# Patient Record
Sex: MALE | Race: BLACK OR AFRICAN AMERICAN | NOT HISPANIC OR LATINO | Employment: OTHER | URBAN - METROPOLITAN AREA
[De-identification: names, ages, dates, MRNs, and addresses within clinical notes are randomized per-mention and may not be internally consistent; named-entity substitution may affect disease eponyms.]

---

## 2018-11-30 ENCOUNTER — HOSPITAL ENCOUNTER (EMERGENCY)
Facility: HOSPITAL | Age: 33
Discharge: HOME | End: 2018-12-01
Attending: EMERGENCY MEDICINE
Payer: MEDICARE

## 2018-11-30 DIAGNOSIS — R07.81 RIB PAIN ON LEFT SIDE: ICD-10-CM

## 2018-11-30 DIAGNOSIS — R10.9 LEFT FLANK PAIN: ICD-10-CM

## 2018-11-30 LAB
BILIRUB UR QL STRIP: NEGATIVE
BUN SERPL-MCNC: 10 MG/DL (ref 6–30)
CHLORIDE SERPL-SCNC: 103 MMOL/L (ref 95–110)
CLARITY UR REFRACT.AUTO: CLEAR
COLOR UR AUTO: YELLOW
CREAT SERPL-MCNC: 0.6 MG/DL (ref 0.5–1.4)
GLUCOSE SERPL-MCNC: 99 MG/DL (ref 70–110)
GLUCOSE UR QL STRIP: NEGATIVE
HCT VFR BLD CALC: 43 %PCV (ref 36–54)
HGB UR QL STRIP: NEGATIVE
KETONES UR QL STRIP: NEGATIVE
LEUKOCYTE ESTERASE UR QL STRIP: NEGATIVE
NITRITE UR QL STRIP: NEGATIVE
PH UR STRIP: 6 [PH] (ref 5–8)
POC IONIZED CALCIUM: 1.12 MMOL/L (ref 1.06–1.42)
POC TCO2 (MEASURED): 26 MMOL/L (ref 23–29)
POTASSIUM BLD-SCNC: 4.7 MMOL/L (ref 3.5–5.1)
PROT UR QL STRIP: NEGATIVE
SAMPLE: NORMAL
SODIUM BLD-SCNC: 141 MMOL/L (ref 136–145)
SP GR UR STRIP: 1.02 (ref 1–1.03)
URN SPEC COLLECT METH UR: NORMAL

## 2018-11-30 PROCEDURE — 99282 EMERGENCY DEPT VISIT SF MDM: CPT | Mod: ,,, | Performed by: EMERGENCY MEDICINE

## 2018-11-30 PROCEDURE — 99283 EMERGENCY DEPT VISIT LOW MDM: CPT | Mod: 25

## 2018-11-30 PROCEDURE — 81003 URINALYSIS AUTO W/O SCOPE: CPT

## 2018-11-30 RX ORDER — LEVOCETIRIZINE DIHYDROCHLORIDE 5 MG/1
5 TABLET, FILM COATED ORAL NIGHTLY
COMMUNITY

## 2018-12-01 VITALS
RESPIRATION RATE: 18 BRPM | HEIGHT: 64 IN | TEMPERATURE: 98 F | WEIGHT: 145 LBS | BODY MASS INDEX: 24.75 KG/M2 | DIASTOLIC BLOOD PRESSURE: 73 MMHG | HEART RATE: 84 BPM | SYSTOLIC BLOOD PRESSURE: 132 MMHG

## 2018-12-01 NOTE — DISCHARGE INSTRUCTIONS
Please take tylenol or motrin for pain at home.   Please follow up with your primary care provider.

## 2018-12-01 NOTE — ED NOTES
Pt. Dc'd home    to wheelchair by family member Family verbalized understanding of Dc instructions

## 2018-12-01 NOTE — ED NOTES
Family member stated Pt. Has pain to Left. Side.Stated pain started a couple hours ago some edema noted to Left. flank area. Pt. Alert and oriented to norm

## 2018-12-01 NOTE — ED PROVIDER NOTES
Encounter Date: 2018       History     Chief Complaint   Patient presents with    Flank Pain     Left sided flank/abd pain. Denies any NVD. Urine darker than usual.      Kimberly Stout is a 33 y.o. male with past medical history of Spastic quadriplegia, mental retardation who presents with L flank pain. History is provided mostly by family at bedside. He has had ~ 1 hour of L flank pain that started after he was lifted into a chair to get a haircut. He has not had this kind of pain before. He has also had dark urine per the family for one week. He has not had fever/ chills, chest pain, shortness of breath, cough, changes in bowel habits.           Review of patient's allergies indicates:   Allergen Reactions    Iodine and iodide containing products Hives    Shellfish containing products Hives    Influenza virus vaccines     Pcn [penicillins]     Sulfa (sulfonamide antibiotics)      Past Medical History:   Diagnosis Date    Anoxic ischemic brain damage of      Mental retardation     Muscle spasms of lower extremity     bilat    Paraplegia     lower extremities     Past Surgical History:   Procedure Laterality Date    SALIVARY GLAND SURGERY      removal     No family history on file.  Social History     Tobacco Use    Smoking status: Never Smoker    Smokeless tobacco: Never Used   Substance Use Topics    Alcohol use: No    Drug use: No     Review of Systems   Constitutional: Negative for chills and fever.   HENT: Negative for congestion, sinus pressure and sinus pain.    Respiratory: Negative for cough and shortness of breath.    Cardiovascular: Negative for chest pain.   Gastrointestinal: Positive for constipation (chronic). Negative for abdominal pain, diarrhea, nausea and vomiting.   Genitourinary: Positive for flank pain. Negative for dysuria and frequency.   Musculoskeletal: Negative for arthralgias and myalgias.   Skin: Negative for color change and pallor.   Neurological: Negative  for headaches.       Physical Exam     Initial Vitals   BP Pulse Resp Temp SpO2   11/30/18 2218 11/30/18 2131 11/30/18 2131 11/30/18 2131 --   (!) 141/83 84 18 98.8 °F (37.1 °C)       MAP       --                Physical Exam    Constitutional: He is not diaphoretic. No distress.   Spastic quadriparesis   HENT:   Head: Normocephalic and atraumatic.   Mouth/Throat: Oropharynx is clear and moist. No oropharyngeal exudate.   Eyes: Conjunctivae are normal. No scleral icterus.   Neck: Normal range of motion. Neck supple. No tracheal deviation present.   Cardiovascular: Normal rate, regular rhythm and normal heart sounds. Exam reveals no gallop and no friction rub.    No murmur heard.  Pulmonary/Chest: Breath sounds normal. No respiratory distress. He has no rales.   Abdominal: Soft. Bowel sounds are normal. He exhibits no distension. There is no tenderness. There is no rebound and no guarding.   Musculoskeletal: He exhibits no edema or tenderness.   No CVA tenderness   Skin: Skin is warm and dry. Capillary refill takes less than 2 seconds. No erythema. No pallor.         ED Course   Procedures  Labs Reviewed   URINALYSIS, REFLEX TO URINE CULTURE    Narrative:     Preferred Collection Type->Urine, Clean Catch  Received a cup of urine.   ISTAT PROCEDURE   ISTAT CHEM8          Imaging Results          X-Ray Ribs 2 View Left (Final result)  Result time 11/30/18 23:41:21    Final result by Dalton Lucero MD (11/30/18 23:41:21)                 Impression:      No displaced left rib fracture.      Electronically signed by: Dalton Lucero MD  Date:    11/30/2018  Time:    23:41             Narrative:    EXAMINATION:  XR RIBS 2 VIEW LEFT    CLINICAL HISTORY:  Pleurodynia    TECHNIQUE:  Two views of the left ribs were performed.    COMPARISON:  10/04/2014.    FINDINGS:  No displaced left rib fracture identified.  Left lung is clear.  No distinct pneumothorax.  No subcutaneous emphysema in the left lateral chest wall.                                X-Ray Abdomen AP 1 View (KUB) (Final result)  Result time 11/30/18 23:42:16    Final result by Dalton Lucero MD (11/30/18 23:42:16)                 Impression:      No acute abnormality identified on this single view.      Electronically signed by: Dalton Lucero MD  Date:    11/30/2018  Time:    23:42             Narrative:    EXAMINATION:  XR ABDOMEN AP 1 VIEW    CLINICAL HISTORY:  Unspecified abdominal pain    TECHNIQUE:  Single AP View of the abdomen was performed.    COMPARISON:  None.    FINDINGS:  Right hemidiaphragm is above the field of view.  Bowel gas pattern is unremarkable.  The patient is tilted toward the left.  No large volume fecal burden.  No acute bony abnormality.                                       APC / Resident Notes:   Mr. Stout is a 32 yo M who presents with L sided flank / rib pain for one hour. Has history of spastic quadriplegia. Not in pain at the moment. Pain started after he was being lifted into a chair to get a haircut. Has not had this kind of pain before. Possible MSK injury sustained during the moving from chair to chair. However, also note darker urine for approx one week, which increases suspicion for nephrolithiasis. No abdominal / flank tenderness on exam, noted greatly increased abdominal musculature. Will evaluate further with UA, CXR and KUB, and obtain chem8.  11:21 PM UA with no blood noted. Normal lytes.  11:55 PM L rib xr with no abnormalities. KUB normal. Will discharge with instructions for otc pain control and with follow up and return precautions.          Attending Attestation:   Physician Attestation Statement for Resident:  As the supervising MD   Physician Attestation Statement: I have personally seen and examined this patient.   I agree with the above history. -:   As the supervising MD I agree with the above PE.   -: General:  Well-appearing in no acute distress.  Lungs:  Clear to auscultation bilaterally, mild left anterior  lower rib tenderness  Cardiac:  Normal sinus rhythm  Abdomen:  Soft mild left upper quadrant/left flank tenderness. No hematoma or deformity  Extremities: contracted  Neuro: awake, alert, can answer simple questions.    As the supervising MD I agree with the above treatment, course, plan, and disposition.   -: 33 M with CP, spastic quadriplegia here with L flank pain after being lifted and darker urine.  Mother provides hx and denies recent fever, chills, cough, or similar pain. Patient also has a history constipation, last bowel movement was yesterday with stool softener.      X-ray and urine are negative for acute process.  Likely muscle strain.  Discussed over-the-counter Tylenol/ Motrin for pain. Patient stable for discharge  I have reviewed and agree with the residents interpretation of the following: x-rays and lab data.                       Clinical Impression:   Diagnoses of Rib pain on left side and Left flank pain were pertinent to this visit.      Disposition:   Disposition: Discharged  Condition: Stable                        Eduard Xie MD  Resident  11/30/18 [id number]       Viridiana Gomez MD  12/01/18 [id number]

## 2019-07-16 DIAGNOSIS — G80.9 CEREBRAL PALSY: Primary | ICD-10-CM

## 2019-07-16 DIAGNOSIS — G82.50 QUADRIPLEGIA: ICD-10-CM

## 2019-08-21 ENCOUNTER — CLINICAL SUPPORT (OUTPATIENT)
Dept: REHABILITATION | Facility: HOSPITAL | Age: 34
End: 2019-08-21
Payer: MEDICARE

## 2019-08-21 DIAGNOSIS — Z74.09 IMPAIRED MOBILITY AND ACTIVITIES OF DAILY LIVING: ICD-10-CM

## 2019-08-21 DIAGNOSIS — G82.50 QUADRIPLEGIA: ICD-10-CM

## 2019-08-21 DIAGNOSIS — G82.50 SPASTIC QUADRIPLEGIA: ICD-10-CM

## 2019-08-21 DIAGNOSIS — Z78.9 IMPAIRED MOBILITY AND ACTIVITIES OF DAILY LIVING: ICD-10-CM

## 2019-08-21 DIAGNOSIS — G80.0 SPASTIC QUADRIPLEGIC CEREBRAL PALSY: ICD-10-CM

## 2019-08-21 PROCEDURE — 97165 OT EVAL LOW COMPLEX 30 MIN: CPT | Mod: PN

## 2019-08-21 PROCEDURE — G8989 SELF CARE D/C STATUS: HCPCS | Mod: CN,PN

## 2019-08-21 PROCEDURE — G8988 SELF CARE GOAL STATUS: HCPCS | Mod: CM,PN

## 2019-08-21 PROCEDURE — G8987 SELF CARE CURRENT STATUS: HCPCS | Mod: CM,PN
